# Patient Record
Sex: FEMALE | Race: WHITE
[De-identification: names, ages, dates, MRNs, and addresses within clinical notes are randomized per-mention and may not be internally consistent; named-entity substitution may affect disease eponyms.]

---

## 2022-07-05 ENCOUNTER — HOSPITAL ENCOUNTER (EMERGENCY)
Age: 27
Discharge: HOME | End: 2022-07-05
Payer: COMMERCIAL

## 2022-07-05 VITALS
SYSTOLIC BLOOD PRESSURE: 130 MMHG | TEMPERATURE: 99.1 F | OXYGEN SATURATION: 100 % | DIASTOLIC BLOOD PRESSURE: 79 MMHG | HEART RATE: 88 BPM | RESPIRATION RATE: 19 BRPM

## 2022-07-05 VITALS
OXYGEN SATURATION: 100 % | HEART RATE: 88 BPM | DIASTOLIC BLOOD PRESSURE: 79 MMHG | RESPIRATION RATE: 19 BRPM | TEMPERATURE: 99.14 F | SYSTOLIC BLOOD PRESSURE: 130 MMHG

## 2022-07-05 VITALS — BODY MASS INDEX: 25.8 KG/M2

## 2022-07-05 DIAGNOSIS — U07.1: Primary | ICD-10-CM

## 2022-07-05 DIAGNOSIS — R50.9: ICD-10-CM

## 2022-07-05 DIAGNOSIS — R51.9: ICD-10-CM

## 2022-07-05 DIAGNOSIS — R53.1: ICD-10-CM

## 2022-07-05 PROCEDURE — 87798 DETECT AGENT NOS DNA AMP: CPT

## 2022-07-05 PROCEDURE — U0005 INFEC AGEN DETEC AMPLI PROBE: HCPCS

## 2022-07-05 PROCEDURE — U0003 INFECTIOUS AGENT DETECTION BY NUCLEIC ACID (DNA OR RNA); SEVERE ACUTE RESPIRATORY SYNDROME CORONAVIRUS 2 (SARS-COV-2) (CORONAVIRUS DISEASE [COVID-19]), AMPLIFIED PROBE TECHNIQUE, MAKING USE OF HIGH THROUGHPUT TECHNOLOGIES AS DESCRIBED BY CMS-2020-01-R: HCPCS

## 2022-07-05 PROCEDURE — 87632 RESP VIRUS 6-11 TARGETS: CPT

## 2022-07-05 PROCEDURE — 87581 M.PNEUMON DNA AMP PROBE: CPT

## 2022-07-05 PROCEDURE — C9803 HOPD COVID-19 SPEC COLLECT: HCPCS

## 2022-07-05 PROCEDURE — G0463 HOSPITAL OUTPT CLINIC VISIT: HCPCS

## 2022-07-05 PROCEDURE — 99212 OFFICE O/P EST SF 10 MIN: CPT

## 2024-02-19 ENCOUNTER — HOSPITAL ENCOUNTER (OUTPATIENT)
Age: 29
End: 2024-02-19
Payer: COMMERCIAL

## 2024-02-19 DIAGNOSIS — Z32.00: Primary | ICD-10-CM

## 2024-02-19 PROCEDURE — 84144 ASSAY OF PROGESTERONE: CPT

## 2024-02-19 PROCEDURE — 36415 COLL VENOUS BLD VENIPUNCTURE: CPT

## 2024-02-19 PROCEDURE — 84702 CHORIONIC GONADOTROPIN TEST: CPT

## 2024-03-13 ENCOUNTER — HOSPITAL ENCOUNTER (OUTPATIENT)
Dept: HOSPITAL 22 - LAB.DROPOF | Age: 29
End: 2024-03-13
Payer: COMMERCIAL

## 2024-03-13 DIAGNOSIS — O26.891: Primary | ICD-10-CM

## 2024-03-13 DIAGNOSIS — Z3A.08: ICD-10-CM

## 2024-03-13 PROCEDURE — 87086 URINE CULTURE/COLONY COUNT: CPT

## 2024-03-21 ENCOUNTER — HOSPITAL ENCOUNTER (OUTPATIENT)
Dept: HOSPITAL 22 - LAB | Age: 29
End: 2024-03-21
Payer: COMMERCIAL

## 2024-03-21 DIAGNOSIS — O26.891: Primary | ICD-10-CM

## 2024-03-21 DIAGNOSIS — Z3A.09: ICD-10-CM

## 2024-03-21 LAB
HCT VFR BLD CALC: 39.2 % (ref 37–47)
HGB BLD-MCNC: 12.8 G/DL (ref 12.2–16.2)
MCHC RBC-ENTMCNC: 32.7 G/DL (ref 31.8–35.4)
MCV RBC: 96.7 FL (ref 81–99)
MEAN CORPUSCULAR HEMOGLOBIN: 31.7 PG (ref 27–31.2)
PLATELET # BLD: 267 K/MM3 (ref 142–424)
RBC # BLD AUTO: 4.06 M/MM3 (ref 4.2–5.4)
WBC # BLD AUTO: 10.4 K/MM3 (ref 4.8–10.8)

## 2024-03-21 PROCEDURE — 86762 RUBELLA ANTIBODY: CPT

## 2024-03-21 PROCEDURE — 86850 RBC ANTIBODY SCREEN: CPT

## 2024-03-21 PROCEDURE — G0432 EIA HIV-1/HIV-2 SCREEN: HCPCS

## 2024-03-21 PROCEDURE — 86593 SYPHILIS TEST NON-TREP QUANT: CPT

## 2024-03-21 PROCEDURE — 85025 COMPLETE CBC W/AUTO DIFF WBC: CPT

## 2024-03-21 PROCEDURE — 36415 COLL VENOUS BLD VENIPUNCTURE: CPT

## 2024-03-21 PROCEDURE — 87380 HEPATITIS DELTA AGENT AG IA: CPT

## 2024-03-21 PROCEDURE — 86703 HIV-1/HIV-2 1 RESULT ANTBDY: CPT

## 2024-03-21 PROCEDURE — 87340 HEPATITIS B SURFACE AG IA: CPT

## 2024-03-22 LAB — RUBELLA ANTIBODIES, IGG: 2.31 INDEX

## 2024-03-23 LAB
HIV-1 AB CHG: (no result)
HIV-2 AB CHG: (no result)
HIV1 RNA CHG: (no result)

## 2024-06-07 ENCOUNTER — HOSPITAL ENCOUNTER (OUTPATIENT)
Dept: HOSPITAL 22 - RAD | Age: 29
Discharge: HOME | End: 2024-06-07
Payer: COMMERCIAL

## 2024-06-07 DIAGNOSIS — Z34.92: ICD-10-CM

## 2024-06-07 DIAGNOSIS — Z36.3: Primary | ICD-10-CM

## 2024-06-07 DIAGNOSIS — Z3A.20: ICD-10-CM

## 2024-06-07 PROCEDURE — 76811 OB US DETAILED SNGL FETUS: CPT

## 2024-06-10 ENCOUNTER — TRANSCRIBE ORDERS (OUTPATIENT)
Dept: OBSTETRICS AND GYNECOLOGY | Facility: HOSPITAL | Age: 29
End: 2024-06-10
Payer: COMMERCIAL

## 2024-06-10 DIAGNOSIS — O35.03X0 CHOROID PLEXUS CYST OF FETUS AFFECTING CARE OF MOTHER, ANTEPARTUM, SINGLE OR UNSPECIFIED FETUS: Primary | ICD-10-CM

## 2024-06-10 DIAGNOSIS — O35.EXX0 ENCOUNTER FOR REPEAT ULTRASOUND OF FETAL PYELECTASIS, ANTEPARTUM, SINGLE OR UNSPECIFIED FETUS: ICD-10-CM

## 2024-06-10 DIAGNOSIS — Z34.90 PREGNANCY, UNSPECIFIED GESTATIONAL AGE: ICD-10-CM

## 2024-06-27 ENCOUNTER — OFFICE VISIT (OUTPATIENT)
Dept: OBSTETRICS AND GYNECOLOGY | Facility: HOSPITAL | Age: 29
End: 2024-06-27
Payer: COMMERCIAL

## 2024-06-27 ENCOUNTER — HOSPITAL ENCOUNTER (OUTPATIENT)
Dept: WOMENS IMAGING | Facility: HOSPITAL | Age: 29
Discharge: HOME | End: 2024-06-27
Admitting: OBSTETRICS & GYNECOLOGY
Payer: COMMERCIAL

## 2024-06-27 VITALS
DIASTOLIC BLOOD PRESSURE: 67 MMHG | SYSTOLIC BLOOD PRESSURE: 104 MMHG | HEIGHT: 68 IN | WEIGHT: 178 LBS | BODY MASS INDEX: 26.98 KG/M2

## 2024-06-27 DIAGNOSIS — O35.EXX0 ENCOUNTER FOR REPEAT ULTRASOUND OF FETAL PYELECTASIS, ANTEPARTUM, SINGLE OR UNSPECIFIED FETUS: ICD-10-CM

## 2024-06-27 DIAGNOSIS — O35.03X0 CHOROID PLEXUS CYST OF FETUS AFFECTING CARE OF MOTHER, ANTEPARTUM, SINGLE OR UNSPECIFIED FETUS: Primary | ICD-10-CM

## 2024-06-27 DIAGNOSIS — Z34.90 PREGNANCY, UNSPECIFIED GESTATIONAL AGE: ICD-10-CM

## 2024-06-27 DIAGNOSIS — O35.EXX0 FETAL HYDRONEPHROSIS DURING PREGNANCY, ANTEPARTUM, SINGLE OR UNSPECIFIED FETUS: ICD-10-CM

## 2024-06-27 DIAGNOSIS — O35.03X0 CHOROID PLEXUS CYST OF FETUS AFFECTING CARE OF MOTHER, ANTEPARTUM, SINGLE OR UNSPECIFIED FETUS: ICD-10-CM

## 2024-06-27 PROCEDURE — 76811 OB US DETAILED SNGL FETUS: CPT

## 2024-06-27 RX ORDER — PRENATAL VIT/IRON FUM/FOLIC AC 27MG-0.8MG
TABLET ORAL DAILY
COMMUNITY

## 2024-06-27 NOTE — PROGRESS NOTES
"Documentation of the ultrasound findings, images, and interpretations will be available in the patient's Viewpoint report which is located in the imaging tab in chart review.    Maternal/Fetal Medicine Consult Note     Name: Corrine López    : 1995     MRN: [id number]     Referring Provider: Jody Dumont DO    Chief Complaint  CPCm bilateral RPD    Subjective     History of Present Illness:  Corrine López is a 28 y.o.  23w4d who presents today for CPC, RPD    TRACY: Estimated Date of Delivery: 10/20/24     ROS:   As noted in HPI.     History reviewed. No pertinent past medical history.   Past Surgical History:   Procedure Laterality Date    WISDOM TOOTH EXTRACTION        OB History          1    Para   0    Term   0       0    AB   0    Living   0         SAB   0    IAB   0    Ectopic   0    Molar   0    Multiple   0    Live Births   0          Obstetric Comments   FOB #1 Pregnancy #1               Objective     Vital Signs  /67   Ht 172.7 cm (68\")   Wt 80.7 kg (178 lb)   LMP  (LMP Unknown)   Estimated body mass index is 27.06 kg/m² as calculated from the following:    Height as of this encounter: 172.7 cm (68\").    Weight as of this encounter: 80.7 kg (178 lb).    Physical Exam    Ultrasound Impression:   See Viewpoint    Assessment and Plan     Corrine López is a 28 y.o.  23w4d who presents today for CPC, RPD    Diagnoses and all orders for this visit:    1. Choroid plexus cyst of fetus affecting care of mother, antepartum, single or unspecified fetus (Primary)  Assessment & Plan:  The potential complications associated with choroid plexus cysts were discussed with the patient. Choroid plexus cysts are associated with an increase in risk for fetal trisomies 18 and 21 syndrome.  In low risk populations with otherwise normal fetal anatomy and normal growth, however, these likely represent a normal variant as they are present in 2-3% of the population. There are " numerous abnormal ultrasonographic findings in trisomy 18 syndrome including clenching of the hands, rockerbottom feet, cleft lip, congenital heart defects, abdominal wall defects, diaphragmatic hernia, neural tube defects, cystic hygroma, single umbilical artery as well as choroid plexus cysts.  As the ultrasonographic evaluation is negative for these findings except for the presence of a choroid plexus cyst (please see above complete ultrasound description), the likelihood of fetal trisomy 18 syndrome is small particularly if 1st and/or 2nd trimester screening are negative.  Choroid plexus cysts pose no concern for abnormal brain growth or development in isolation. Typically these cysts will resolve by 26 weeks' gestation.      Orders:  -     [facility] [place]; Future    2. Fetal hydronephrosis during pregnancy, antepartum, single or unspecified fetus  Assessment & Plan:  The fetus has bilateral mild renal pyelectasis.  The pelviureteric junctions are dilated with AP diameters of 4 mm on the left and 7 mm on the right.  At this gestational age this is regarded as mildly dilated.  The dilatation is extrarenal (does not penetrate the parenchyma) consistent with stage 1 hydronephrosis.  There is no extension of this dilatation into the kidney pelves bilaterally suggesting that there is no significant UPJ obstruction.  There are no signs of ureterocele or duplication of the ureteric system.  There is normal amniotic fluid volume.      The implications of this ultrasound finding were explained to the patient.  The majority of antenatally diagnosed cases of prenatal hydronephrosis resolve spontaneously in the  period with only 3 to 4% requiring surgical treatment.  A risk of 1% for Down syndrome or some other chromosomal aneuploidy has been quoted with this finding.  She was informed that very occasionally the kidney may dilate substantially but that only in a situation where both kidneys are  threatened or should oligohydramnios develop would she be delivered early for fetal hydronephrosis.  I have told the patient that her infant will need to be followed by a pediatrician or pediatric urologist after delivery if the renal pelvis dilatation is > 7 mm.    We will rescan the patient again at 32 weeks gestation to once again assess fetal kidneys and choroid plexus cyst.    Orders:  -     [facility]; Future    3. Pregnancy, unspecified gestational age  -     [facility] [place]; Future         Follow Up  Return in about 9 years (around 2033).    I spent 20 minutes caring for the patient on the day of service. This included: obtaining or reviewing a separately obtained medical history, reviewing patient records, performing a medically appropriate exam and/or evaluation, counseling or educating the patient/family/caregiver, ordering medications, labs, and/or procedures and documenting such in the medical record. This does not include time spent on review and interpretation of other tests such as fetal ultrasound or the performance of other procedures such as amniocentesis or CVS.      Douglas A. Milligan, MD  Maternal Fetal Medicine, [facility]     2024

## 2024-06-27 NOTE — PROGRESS NOTES
Patient denies bleeding, leaking fluid or contractions  NIPT low risk  Patients next follow up with Dr. Dumont is 7/3/24

## 2024-06-27 NOTE — LETTER
"2024     Jody Dumont DO  [address]  [place] [address]    Patient: Corrine López   YOB: 1995   Date of Visit: 2024     Dear Jody Dumont DO:       Thank you for referring Corrine López to me for evaluation. Below are the relevant portions of my assessment and plan of care.    If you have questions, please do not hesitate to call me. I look forward to following Corrine along with you.         Sincerely,        Douglas A. Milligan, MD        CC: No Recipients    Milligan, Douglas A, MD  24 [id number]  Sign when Signing Visit  Documentation of the ultrasound findings, images, and interpretations will be available in the patient's Viewpoint report which is located in the imaging tab in chart review.    Maternal/Fetal Medicine Consult Note     Name: Corrine López    : 1995     MRN: [id number]     Referring Provider: Jody Dumont DO    Chief Complaint  CPCm bilateral RPD    Subjective     History of Present Illness:  Corrine Lópze is a 28 y.o.  23w4d who presents today for CPC, RPD    TRACY: Estimated Date of Delivery: 10/20/24     ROS:   As noted in HPI.     History reviewed. No pertinent past medical history.   Past Surgical History:   Procedure Laterality Date   • WISDOM TOOTH EXTRACTION        OB History          1    Para   0    Term   0       0    AB   0    Living   0         SAB   0    IAB   0    Ectopic   0    Molar   0    Multiple   0    Live Births   0          Obstetric Comments   FOB #1 Pregnancy #1               Objective     Vital Signs  /67   Ht 172.7 cm (68\")   Wt 80.7 kg (178 lb)   LMP  (LMP Unknown)   Estimated body mass index is 27.06 kg/m² as calculated from the following:    Height as of this encounter: 172.7 cm (68\").    Weight as of this encounter: 80.7 kg (178 lb).    Physical Exam    Ultrasound Impression:   See Viewpoint    Assessment and Plan     Corrine López is a 28 y.o.  23w4d who " presents today for CPC, RPD    Diagnoses and all orders for this visit:    1. Choroid plexus cyst of fetus affecting care of mother, antepartum, single or unspecified fetus (Primary)  Assessment & Plan:  The potential complications associated with choroid plexus cysts were discussed with the patient. Choroid plexus cysts are associated with an increase in risk for fetal trisomies 18 and 21 syndrome.  In low risk populations with otherwise normal fetal anatomy and normal growth, however, these likely represent a normal variant as they are present in 2-3% of the population. There are numerous abnormal ultrasonographic findings in trisomy 18 syndrome including clenching of the hands, rockerbottom feet, cleft lip, congenital heart defects, abdominal wall defects, diaphragmatic hernia, neural tube defects, cystic hygroma, single umbilical artery as well as choroid plexus cysts.  As the ultrasonographic evaluation is negative for these findings except for the presence of a choroid plexus cyst (please see above complete ultrasound description), the likelihood of fetal trisomy 18 syndrome is small particularly if 1st and/or 2nd trimester screening are negative.  Choroid plexus cysts pose no concern for abnormal brain growth or development in isolation. Typically these cysts will resolve by 26 weeks' gestation.      Orders:  -     [facility]; Future    2. Fetal hydronephrosis during pregnancy, antepartum, single or unspecified fetus  Assessment & Plan:  The fetus has bilateral mild renal pyelectasis.  The pelviureteric junctions are dilated with AP diameters of 4 mm on the left and 7 mm on the right.  At this gestational age this is regarded as mildly dilated.  The dilatation is extrarenal (does not penetrate the parenchyma) consistent with stage 1 hydronephrosis.  There is no extension of this dilatation into the kidney pelves bilaterally suggesting that there is no significant UPJ obstruction.   There are no signs of ureterocele or duplication of the ureteric system.  There is normal amniotic fluid volume.      The implications of this ultrasound finding were explained to the patient.  The majority of antenatally diagnosed cases of prenatal hydronephrosis resolve spontaneously in the  period with only 3 to 4% requiring surgical treatment.  A risk of 1% for Down syndrome or some other chromosomal aneuploidy has been quoted with this finding.  She was informed that very occasionally the kidney may dilate substantially but that only in a situation where both kidneys are threatened or should oligohydramnios develop would she be delivered early for fetal hydronephrosis.  I have told the patient that her infant will need to be followed by a pediatrician or pediatric urologist after delivery if the renal pelvis dilatation is > 7 mm.    We will rescan the patient again at 32 weeks gestation to once again assess fetal kidneys and choroid plexus cyst.    Orders:  -     [facility]; Future    3. Pregnancy, unspecified gestational age  -     [facility] [place]; Future         Follow Up  Return in about 9 years (around 2033).    I spent 20 minutes caring for the patient on the day of service. This included: obtaining or reviewing a separately obtained medical history, reviewing patient records, performing a medically appropriate exam and/or evaluation, counseling or educating the patient/family/caregiver, ordering medications, labs, and/or procedures and documenting such in the medical record. This does not include time spent on review and interpretation of other tests such as fetal ultrasound or the performance of other procedures such as amniocentesis or CVS.      Douglas A. Milligan, MD  Maternal Fetal Medicine, [facility] [place]     2024

## 2024-06-27 NOTE — ASSESSMENT & PLAN NOTE
The potential complications associated with choroid plexus cysts were discussed with the patient. Choroid plexus cysts are associated with an increase in risk for fetal trisomies 18 and 21 syndrome.  In low risk populations with otherwise normal fetal anatomy and normal growth, however, these likely represent a normal variant as they are present in 2-3% of the population. There are numerous abnormal ultrasonographic findings in trisomy 18 syndrome including clenching of the hands, rockerbottom feet, cleft lip, congenital heart defects, abdominal wall defects, diaphragmatic hernia, neural tube defects, cystic hygroma, single umbilical artery as well as choroid plexus cysts.  As the ultrasonographic evaluation is negative for these findings except for the presence of a choroid plexus cyst (please see above complete ultrasound description), the likelihood of fetal trisomy 18 syndrome is small particularly if 1st and/or 2nd trimester screening are negative.  Choroid plexus cysts pose no concern for abnormal brain growth or development in isolation. Typically these cysts will resolve by 26 weeks' gestation.

## 2024-06-27 NOTE — ASSESSMENT & PLAN NOTE
The fetus has bilateral mild renal pyelectasis.  The pelviureteric junctions are dilated with AP diameters of 4 mm on the left and 7 mm on the right.  At this gestational age this is regarded as mildly dilated.  The dilatation is extrarenal (does not penetrate the parenchyma) consistent with stage 1 hydronephrosis.  There is no extension of this dilatation into the kidney pelves bilaterally suggesting that there is no significant UPJ obstruction.  There are no signs of ureterocele or duplication of the ureteric system.  There is normal amniotic fluid volume.      The implications of this ultrasound finding were explained to the patient.  The majority of antenatally diagnosed cases of prenatal hydronephrosis resolve spontaneously in the  period with only 3 to 4% requiring surgical treatment.  A risk of 1% for Down syndrome or some other chromosomal aneuploidy has been quoted with this finding.  She was informed that very occasionally the kidney may dilate substantially but that only in a situation where both kidneys are threatened or should oligohydramnios develop would she be delivered early for fetal hydronephrosis.  I have told the patient that her infant will need to be followed by a pediatrician or pediatric urologist after delivery if the renal pelvis dilatation is > 7 mm.    We will rescan the patient again at 32 weeks gestation to once again assess fetal kidneys and choroid plexus cyst.

## 2024-08-29 ENCOUNTER — HOSPITAL ENCOUNTER (OUTPATIENT)
Dept: WOMENS IMAGING | Facility: HOSPITAL | Age: 29
Discharge: HOME | End: 2024-08-29
Admitting: OBSTETRICS & GYNECOLOGY
Payer: COMMERCIAL

## 2024-08-29 ENCOUNTER — OFFICE VISIT (OUTPATIENT)
Dept: OBSTETRICS AND GYNECOLOGY | Facility: HOSPITAL | Age: 29
End: 2024-08-29
Payer: COMMERCIAL

## 2024-08-29 DIAGNOSIS — O35.EXX0 FETAL HYDRONEPHROSIS DURING PREGNANCY, ANTEPARTUM, SINGLE OR UNSPECIFIED FETUS: ICD-10-CM

## 2024-08-29 DIAGNOSIS — O35.03X0 CHOROID PLEXUS CYST OF FETUS AFFECTING CARE OF MOTHER, ANTEPARTUM, SINGLE OR UNSPECIFIED FETUS: ICD-10-CM

## 2024-08-29 DIAGNOSIS — Z3A.32 32 WEEKS GESTATION OF PREGNANCY: Primary | ICD-10-CM

## 2024-08-29 DIAGNOSIS — Z34.90 PREGNANCY, UNSPECIFIED GESTATIONAL AGE: ICD-10-CM

## 2024-08-29 PROCEDURE — 76816 OB US FOLLOW-UP PER FETUS: CPT

## 2024-08-29 PROCEDURE — 76819 FETAL BIOPHYS PROFIL W/O NST: CPT
